# Patient Record
Sex: FEMALE | Race: BLACK OR AFRICAN AMERICAN | NOT HISPANIC OR LATINO | Employment: OTHER | URBAN - METROPOLITAN AREA
[De-identification: names, ages, dates, MRNs, and addresses within clinical notes are randomized per-mention and may not be internally consistent; named-entity substitution may affect disease eponyms.]

---

## 2017-12-27 ENCOUNTER — HOSPITAL ENCOUNTER (EMERGENCY)
Facility: HOSPITAL | Age: 67
Discharge: HOME | End: 2017-12-27
Attending: EMERGENCY MEDICINE
Payer: MEDICARE

## 2017-12-27 VITALS
SYSTOLIC BLOOD PRESSURE: 132 MMHG | HEART RATE: 86 BPM | TEMPERATURE: 98 F | DIASTOLIC BLOOD PRESSURE: 78 MMHG | RESPIRATION RATE: 18 BRPM | OXYGEN SATURATION: 99 %

## 2017-12-27 DIAGNOSIS — S60.222A CONTUSION OF LEFT HAND, INITIAL ENCOUNTER: Primary | ICD-10-CM

## 2017-12-27 PROCEDURE — 25000003 PHARM REV CODE 250: Performed by: EMERGENCY MEDICINE

## 2017-12-27 PROCEDURE — 99283 EMERGENCY DEPT VISIT LOW MDM: CPT

## 2017-12-27 RX ORDER — ACETAMINOPHEN 500 MG
5000 TABLET ORAL DAILY
COMMUNITY

## 2017-12-27 RX ORDER — IBUPROFEN 600 MG/1
600 TABLET ORAL
Status: COMPLETED | OUTPATIENT
Start: 2017-12-27 | End: 2017-12-27

## 2017-12-27 RX ADMIN — IBUPROFEN 600 MG: 600 TABLET ORAL at 03:12

## 2017-12-27 NOTE — ED PROVIDER NOTES
Encounter Date: 12/27/2017       History     Chief Complaint   Patient presents with    Hand Problem     Patient is a 67-year-old female presenting to the emergency Department complaints of swelling over the left second knuckle that is bluish in color and mildly tender.  She denies knowledge of traumatic accident.  She denies change in strength or sensation in her hand or fingers.  She denies taking anything for symptoms prior to arrival.  She denies fever, red rash or red streaking.          Review of patient's allergies indicates:  No Known Allergies  History reviewed. No pertinent past medical history.  Past Surgical History:   Procedure Laterality Date    APPENDECTOMY       History reviewed. No pertinent family history.  Social History   Substance Use Topics    Smoking status: Never Smoker    Smokeless tobacco: Not on file    Alcohol use No     Review of Systems   Constitutional: Negative for fever.   Musculoskeletal: Positive for joint swelling.   Skin: Negative for rash.   Allergic/Immunologic: Negative for immunocompromised state.       Physical Exam     Initial Vitals [12/27/17 0355]   BP Pulse Resp Temp SpO2   132/78 86 18 97.8 °F (36.6 °C) 99 %      MAP       96         Physical Exam    Nursing note and vitals reviewed.  Constitutional: She appears well-nourished. No distress.   HENT:   Head: Normocephalic and atraumatic.   Eyes: EOM are normal.   Neck: Normal range of motion.   Pulmonary/Chest: No respiratory distress.   Musculoskeletal: She exhibits edema and tenderness.   Mild edema and tenderness over the distal left second metacarpal, bluish in color, consistent with contusion, no fluctuance or red streaking, neurovascularly intact hand   Neurological: She is alert and oriented to person, place, and time. She has normal strength. No sensory deficit.   Skin: Skin is warm and dry. Capillary refill takes less than 2 seconds. No rash noted. No erythema.   Psychiatric: She has a normal mood and  affect. Thought content normal.         ED Course   Procedures  Labs Reviewed - No data to display          Medical Decision Making:   Initial Assessment:   On exam the patient appears to have a small contusion consistent with a ruptured vein over the dorsum of the hand.  There is a small abrasion and I think that she may have bumped her hand into a surface she is unaware of.  She is reporting improvement with ice since being in the ER and she is asked to continue this.  She is asked to take over-the-counter anti-inflammatory medications and was given Motrin here.  She is asked to follow-up with her primary care doctor as soon as possible regarding improvement.  She is asked to return to the ER for any new, concerning, or worsening symptoms, including worsening pain, redness, fever, red streaking.    ED Management:  Patient agreeable with this plan for follow-up and she was discharged in stable condition.                     ED Course      Clinical Impression:   The encounter diagnosis was Contusion of left hand, initial encounter.    Disposition:   Disposition: Discharged  Condition: Stable                        James Lee MD  12/27/17 0709

## 2021-10-04 NOTE — ED NOTES
Pt departed ED after rec D/C instructions which she verbalized understanding of.  Pt departed with ABC's intact awake and alert with no distress noted.  All personal property with pt from ED.  
Pt to room 14 with complaint of hand pain.  ABC's intact awake and alert with no distress noted.  MD aware of pt's location and complaint.  
23